# Patient Record
Sex: MALE | Race: WHITE | URBAN - METROPOLITAN AREA
[De-identification: names, ages, dates, MRNs, and addresses within clinical notes are randomized per-mention and may not be internally consistent; named-entity substitution may affect disease eponyms.]

---

## 2024-09-24 ENCOUNTER — OUTPATIENT (OUTPATIENT)
Dept: OUTPATIENT SERVICES | Facility: HOSPITAL | Age: 49
LOS: 1 days | Discharge: HOME | End: 2024-09-24
Payer: COMMERCIAL

## 2024-09-24 ENCOUNTER — RESULT REVIEW (OUTPATIENT)
Age: 49
End: 2024-09-24

## 2024-09-24 VITALS
HEART RATE: 85 BPM | OXYGEN SATURATION: 98 % | SYSTOLIC BLOOD PRESSURE: 124 MMHG | WEIGHT: 203.05 LBS | TEMPERATURE: 98 F | DIASTOLIC BLOOD PRESSURE: 78 MMHG | RESPIRATION RATE: 18 BRPM | HEIGHT: 69 IN

## 2024-09-24 VITALS
RESPIRATION RATE: 18 BRPM | SYSTOLIC BLOOD PRESSURE: 116 MMHG | DIASTOLIC BLOOD PRESSURE: 68 MMHG | HEART RATE: 75 BPM | OXYGEN SATURATION: 99 %

## 2024-09-24 DIAGNOSIS — R17 UNSPECIFIED JAUNDICE: ICD-10-CM

## 2024-09-24 PROBLEM — Z00.00 ENCOUNTER FOR PREVENTIVE HEALTH EXAMINATION: Status: ACTIVE | Noted: 2024-09-24

## 2024-09-24 PROCEDURE — 43239 EGD BIOPSY SINGLE/MULTIPLE: CPT | Mod: XU

## 2024-09-24 PROCEDURE — 88342 IMHCHEM/IMCYTCHM 1ST ANTB: CPT | Mod: 26

## 2024-09-24 PROCEDURE — 88341 IMHCHEM/IMCYTCHM EA ADD ANTB: CPT

## 2024-09-24 PROCEDURE — 88305 TISSUE EXAM BY PATHOLOGIST: CPT | Mod: 26

## 2024-09-24 PROCEDURE — 43274 ERCP DUCT STENT PLACEMENT: CPT

## 2024-09-24 PROCEDURE — 88173 CYTOPATH EVAL FNA REPORT: CPT

## 2024-09-24 PROCEDURE — 88312 SPECIAL STAINS GROUP 1: CPT | Mod: 26

## 2024-09-24 PROCEDURE — 88312 SPECIAL STAINS GROUP 1: CPT

## 2024-09-24 PROCEDURE — 88305 TISSUE EXAM BY PATHOLOGIST: CPT

## 2024-09-24 PROCEDURE — 43238 EGD US FINE NEEDLE BX/ASPIR: CPT | Mod: XU

## 2024-09-24 PROCEDURE — 88342 IMHCHEM/IMCYTCHM 1ST ANTB: CPT

## 2024-09-24 PROCEDURE — 88341 IMHCHEM/IMCYTCHM EA ADD ANTB: CPT | Mod: 26

## 2024-09-24 PROCEDURE — C9399: CPT

## 2024-09-24 PROCEDURE — 43262 ENDO CHOLANGIOPANCREATOGRAPH: CPT | Mod: XU

## 2024-09-24 PROCEDURE — 88360 TUMOR IMMUNOHISTOCHEM/MANUAL: CPT

## 2024-09-24 PROCEDURE — 74018 RADEX ABDOMEN 1 VIEW: CPT

## 2024-09-24 PROCEDURE — C1769: CPT

## 2024-09-24 PROCEDURE — 88173 CYTOPATH EVAL FNA REPORT: CPT | Mod: 26

## 2024-09-24 PROCEDURE — C1874: CPT

## 2024-09-24 PROCEDURE — 88360 TUMOR IMMUNOHISTOCHEM/MANUAL: CPT | Mod: 26,1L,59

## 2024-09-24 PROCEDURE — 74328 X-RAY BILE DUCT ENDOSCOPY: CPT | Mod: 26

## 2024-09-24 NOTE — ASU DISCHARGE PLAN (ADULT/PEDIATRIC) - B. DRINK ALCOHOL, BEER, OR WINE
Rocephin - finished 3 days  Recent UA positive.   UA and Bcx at lab.  Monitor of further abx for now but low threshold for resumption of antibiotics. Statement Selected

## 2024-09-24 NOTE — CHART NOTE - NSCHARTNOTEFT_GEN_A_CORE
PACU ANESTHESIA ADMISSION NOTE      Procedure: EGD /EUS/ERCP  Post op diagnosis:      ____  Intubated  TV:______       Rate: ______      FiO2: ______    __X__  Patent Airway    __X__  Full return of protective reflexes    ____  Full recovery from anesthesia / back to baseline status    Vitals:  T: 97.5F  HR:76  BP: 136/72  RR: 15  SpO2: 99%    Mental Status:  __X__ Awake   _____ Alert   _____ Drowsy   _____ Sedated    Nausea/Vomiting:  ___X_ NO  ______Yes,   See Post - Op Orders          Pain Scale (0-10):  ___0__    Treatment: ____ None    ____ See Post - Op/PCA Orders    Post - Operative Fluids:   ____ Oral   __X__ See Post - Op Orders    Plan: Discharge:   _X___Home       _____Floor     _____Critical Care    _____  Other:_________________    Comments: No anesthetic related complications noted.

## 2024-09-26 LAB — SURGICAL PATHOLOGY STUDY: SIGNIFICANT CHANGE UP

## 2024-09-27 ENCOUNTER — NON-APPOINTMENT (OUTPATIENT)
Age: 49
End: 2024-09-27

## 2024-09-30 ENCOUNTER — NON-APPOINTMENT (OUTPATIENT)
Age: 49
End: 2024-09-30

## 2024-10-01 DIAGNOSIS — Z88.5 ALLERGY STATUS TO NARCOTIC AGENT: ICD-10-CM

## 2024-10-01 DIAGNOSIS — K83.1 OBSTRUCTION OF BILE DUCT: ICD-10-CM

## 2024-10-01 DIAGNOSIS — K86.89 OTHER SPECIFIED DISEASES OF PANCREAS: ICD-10-CM

## 2024-10-01 DIAGNOSIS — R17 UNSPECIFIED JAUNDICE: ICD-10-CM

## 2024-10-01 DIAGNOSIS — Z88.0 ALLERGY STATUS TO PENICILLIN: ICD-10-CM

## 2024-10-01 DIAGNOSIS — K83.8 OTHER SPECIFIED DISEASES OF BILIARY TRACT: ICD-10-CM

## 2024-10-11 LAB — NON-GYNECOLOGICAL CYTOLOGY STUDY: SIGNIFICANT CHANGE UP

## 2024-10-16 ENCOUNTER — TRANSCRIPTION ENCOUNTER (OUTPATIENT)
Age: 49
End: 2024-10-16